# Patient Record
Sex: MALE | Race: WHITE | NOT HISPANIC OR LATINO
[De-identification: names, ages, dates, MRNs, and addresses within clinical notes are randomized per-mention and may not be internally consistent; named-entity substitution may affect disease eponyms.]

---

## 2021-09-20 PROBLEM — Z00.00 ENCOUNTER FOR PREVENTIVE HEALTH EXAMINATION: Status: ACTIVE | Noted: 2021-09-20

## 2021-09-23 ENCOUNTER — APPOINTMENT (OUTPATIENT)
Dept: NEPHROLOGY | Facility: CLINIC | Age: 66
End: 2021-09-23

## 2021-10-03 ENCOUNTER — RESULT REVIEW (OUTPATIENT)
Age: 66
End: 2021-10-03

## 2021-10-04 ENCOUNTER — NON-APPOINTMENT (OUTPATIENT)
Age: 66
End: 2021-10-04

## 2021-10-04 ENCOUNTER — RESULT REVIEW (OUTPATIENT)
Age: 66
End: 2021-10-04

## 2021-10-04 ENCOUNTER — APPOINTMENT (OUTPATIENT)
Dept: HEMATOLOGY ONCOLOGY | Facility: CLINIC | Age: 66
End: 2021-10-04
Payer: COMMERCIAL

## 2021-10-04 VITALS
HEIGHT: 69.49 IN | RESPIRATION RATE: 16 BRPM | SYSTOLIC BLOOD PRESSURE: 138 MMHG | HEART RATE: 84 BPM | TEMPERATURE: 98.6 F | OXYGEN SATURATION: 96 % | WEIGHT: 190 LBS | DIASTOLIC BLOOD PRESSURE: 77 MMHG | BODY MASS INDEX: 27.51 KG/M2

## 2021-10-04 PROCEDURE — 38222 DX BONE MARROW BX & ASPIR: CPT

## 2021-10-04 PROCEDURE — 99215 OFFICE O/P EST HI 40 MIN: CPT | Mod: 25

## 2021-10-04 RX ORDER — LEVOTHYROXINE SODIUM 137 UG/1
TABLET ORAL
Refills: 0 | Status: ACTIVE | COMMUNITY

## 2021-10-04 RX ORDER — TICAGRELOR 90 MG/1
90 TABLET ORAL
Refills: 0 | Status: ACTIVE | COMMUNITY

## 2021-10-04 RX ORDER — ROSUVASTATIN CALCIUM 20 MG/1
20 TABLET, FILM COATED ORAL
Refills: 0 | Status: ACTIVE | COMMUNITY

## 2021-10-04 RX ORDER — METOPROLOL SUCCINATE 25 MG/1
25 TABLET, EXTENDED RELEASE ORAL
Refills: 0 | Status: ACTIVE | COMMUNITY

## 2021-10-04 RX ORDER — VIT A AND D3 IN COD LIVER OIL 1250-135
100 CAPSULE ORAL
Refills: 0 | Status: ACTIVE | COMMUNITY

## 2021-10-04 RX ORDER — TAMSULOSIN HYDROCHLORIDE 0.4 MG/1
0.4 CAPSULE ORAL
Refills: 0 | Status: ACTIVE | COMMUNITY

## 2021-10-04 RX ORDER — PREDNISONE 20 MG/1
20 TABLET ORAL
Refills: 0 | Status: ACTIVE | COMMUNITY

## 2021-10-04 RX ORDER — NITROGLYCERIN 0.6 MG/1
TABLET SUBLINGUAL
Refills: 0 | Status: ACTIVE | COMMUNITY

## 2021-10-04 NOTE — PROCEDURE
[Bone Marrow Biopsy] : bone marrow biopsy [Bone Marrow Aspiration] : bone marrow aspiration  [Patient] : the patient [Patient identification verified] : patient identification verified [Procedure verified and consent obtained] : procedure verified and consent obtained [Laterality verified and correct site marked] : laterality verified and correct site marked [Left] : site: left [Correct positioning] : correct positioning [Prone] : prone [Superior iliac spine was identified] : the superior iliac spine was identified. [The left posterior iliac crest was prepped with betadine and draped, using sterile technique.] : The left posterior iliac crest was prepped with betadine and draped, using sterile technique. [Lidocaine was injected and into the periosteum overlying the site.] : Lidocaine was injected and into the periosteum overlying the site. [Aspirate] : aspirate [Cytogenetics] : cytogenetics [FISH] : FISH [Other ___] : [unfilled] [Biopsy] : biopsy [Flow Cytometry] : flow cytometry [] : The patient was instructed to remove the bandage the following AM. The patient may bathe. Acetaminophen may be taken for discomfort, as per package directions.If there are any other problems, the patient was instructed to call the office. The patient verbalized understanding, and is aware of the office contact numbers. [FreeTextEntry1] : Rule out leukemia

## 2021-10-04 NOTE — PHYSICAL EXAM
[Restricted in physically strenuous activity but ambulatory and able to carry out work of a light or sedentary nature] : Status 1- Restricted in physically strenuous activity but ambulatory and able to carry out work of a light or sedentary nature, e.g., light house work, office work [Normal] : grossly intact [de-identified] : Anxious

## 2021-10-04 NOTE — HISTORY OF PRESENT ILLNESS
[de-identified] : Mr Perez is a very pleasant 66 year old gentleman with PMHx of CAD with h/o AMI and stent placement/ cardiac arrest s/p resuscitation ~3 years ago,  vitiligo, hypothyroidism, BPH,  h/o nephrotic syndrome diagnosed 5 years ago - underwent treatment with steroids and cytoxan, recenlty with relapsed disease started on prednisone 120 mg QOD with DR Card, seen in The ED yesterday for abnormal labs (thrombocytopenia, leucocytosis/monocytosis) seen today for further work up\par \par He feels good overall\par Denies any recent infections, new medications\par COVID vaccine was 1/2021

## 2021-10-04 NOTE — ASSESSMENT
[FreeTextEntry1] : Thrombocytopenia\par Monocytosis\par Has easy bruising\par \par Discussed at length about the differential diagnosis of thrombocytopenia such as viral infection (HIV, Hepatitis B, Hepatitis C), nutritional (B12, folate deficiency), thyroid disorders, liver disease, alcohol, hypersplenism, drug induced, connective tissue disorders, ITP, Aplastic anemia, MDS\par Discussed about dd for monocytosis including infections, medications, CMML etc\par Send blood work including peripheral blood flow cytometry. BCR-ABL\par Stat bone marrow done today after taking informed signed consent\par \par Patient had multiple questions which were answered to satisfaction\par \par Follow up in 1 week\par Labs post visit\par

## 2021-10-12 DIAGNOSIS — N04.9 NEPHROTIC SYNDROME WITH UNSPECIFIED MORPHOLOGIC CHANGES: ICD-10-CM

## 2021-10-14 ENCOUNTER — RESULT REVIEW (OUTPATIENT)
Age: 66
End: 2021-10-14

## 2021-10-14 ENCOUNTER — APPOINTMENT (OUTPATIENT)
Dept: HEMATOLOGY ONCOLOGY | Facility: CLINIC | Age: 66
End: 2021-10-14
Payer: COMMERCIAL

## 2021-10-14 VITALS
BODY MASS INDEX: 24.98 KG/M2 | HEIGHT: 69.49 IN | SYSTOLIC BLOOD PRESSURE: 120 MMHG | RESPIRATION RATE: 18 BRPM | DIASTOLIC BLOOD PRESSURE: 72 MMHG | WEIGHT: 172.56 LBS | TEMPERATURE: 97.1 F | OXYGEN SATURATION: 99 % | HEART RATE: 81 BPM

## 2021-10-14 DIAGNOSIS — C93.10 CHRONIC MYELOMONOCYTIC LEUKEMIA NOT HAVING ACHIEVED REMISSION: ICD-10-CM

## 2021-10-14 PROCEDURE — 99215 OFFICE O/P EST HI 40 MIN: CPT

## 2021-10-14 NOTE — PHYSICAL EXAM
[Restricted in physically strenuous activity but ambulatory and able to carry out work of a light or sedentary nature] : Status 1- Restricted in physically strenuous activity but ambulatory and able to carry out work of a light or sedentary nature, e.g., light house work, office work [Normal] : grossly intact [de-identified] : Anxious

## 2021-10-14 NOTE — HISTORY OF PRESENT ILLNESS
[de-identified] : Mr Perez is a very pleasant 66 year old gentleman with PMHx of CAD with h/o AMI and stent placement/ cardiac arrest s/p resuscitation ~3 years ago,  vitiligo, hypothyroidism, BPH,  h/o nephrotic syndrome diagnosed 5 years ago - underwent treatment with steroids and cytoxan, recenlty with relapsed disease started on prednisone 120 mg QOD with DR Card, seen in The ED yesterday for abnormal labs (thrombocytopenia, leucocytosis/monocytosis) seen today for further work up\par \par He feels good overall\par Denies any recent infections, new medications\par COVID vaccine was 1/2021 [de-identified] : Patient is seen today for follow up\par Accompanied by his wife \par \par Continues to be on prednisone 120 mg since 9/23/21\par Emotionally he has been labile\par \par Bone marrow biopsy and bone marrow aspirate (10/4/21)\par - Chronic  myelomonocytic   leukemia-2 (CMML-2)\par Cytogenetics, NGS- pending\par \par

## 2021-10-14 NOTE — ASSESSMENT
[FreeTextEntry1] : Chronic myelomonocytic leukemia (CMML-2)\par Cytogenetics, NGS panels pending\par Prognostic models can be confounded by the fact that high dose steroids can affect leucocytosis and KENAN can affects Hgb levels\par Also NGS and cytogenetics are pending\par He has at least Intermediate - high risk disease based on Cabrera model \par \par Discussed at length about the diagnosis, work up, staging, prognosis and treatment options\par Explained that this is a rare entity and there is no high level evidence about the management \par Recommend HMA bridge to allogeneic stem cell transplant (if he can be a candidate)\par I have reviewed the risks, benefits and side effects of chemotherapy with the patient. All questions were answered to satisfaction. Patient agrees to pursue the planned chemotherapy.\par Will refer to Dr Brandon for second opinion\par Plan:\par Azacitidine 75 mg/m2 SUBQ x 7 days Q28 days\par Allopurinol with treatment\par \par Nephrotic syndrome\par Follows with Dr Card\par On prednisone 120 mg QD\par He will send urine protein/creatinine today. \par He prefers to do blood work next week when he starts treatment\par \par Emotional counselling provided\par His diagnosis was discussed with Dr Banks (PCP) upon patients request\par \par Follow up in 1 week to begin treatment\par CBC, CMP, LDH, uric acid, phos, magnesium\par

## 2021-10-18 DIAGNOSIS — D69.6 THROMBOCYTOPENIA, UNSPECIFIED: ICD-10-CM

## 2021-10-18 DIAGNOSIS — D72.821 MONOCYTOSIS (SYMPTOMATIC): ICD-10-CM

## 2021-10-19 ENCOUNTER — RESULT REVIEW (OUTPATIENT)
Age: 66
End: 2021-10-19

## 2021-10-19 ENCOUNTER — APPOINTMENT (OUTPATIENT)
Dept: HEMATOLOGY ONCOLOGY | Facility: CLINIC | Age: 66
End: 2021-10-19
Payer: COMMERCIAL

## 2021-10-19 VITALS
SYSTOLIC BLOOD PRESSURE: 109 MMHG | OXYGEN SATURATION: 100 % | DIASTOLIC BLOOD PRESSURE: 70 MMHG | RESPIRATION RATE: 16 BRPM | TEMPERATURE: 97.1 F | WEIGHT: 171.8 LBS | BODY MASS INDEX: 24.87 KG/M2 | HEART RATE: 89 BPM | HEIGHT: 69.49 IN

## 2021-10-19 DIAGNOSIS — C92.00 ACUTE MYELOBLASTIC LEUKEMIA, NOT HAVING ACHIEVED REMISSION: ICD-10-CM

## 2021-10-19 PROCEDURE — 99215 OFFICE O/P EST HI 40 MIN: CPT | Mod: 25

## 2021-10-19 PROCEDURE — 36415 COLL VENOUS BLD VENIPUNCTURE: CPT

## 2021-10-20 PROBLEM — C92.00 AML (ACUTE MYELOBLASTIC LEUKEMIA): Status: ACTIVE | Noted: 2021-10-20

## 2021-10-20 NOTE — ASSESSMENT
[FreeTextEntry1] : Chronic myelomonocytic leukemia (CMML-2)\par Cytogenetics, NGS panels pending\par Prognostic models can be confounded by the fact that high dose steroids can affect leucocytosis and KENAN can affects Hgb levels\par Also NGS and cytogenetics are pending\par He has at least Intermediate - high risk disease based on Cabrera model \par \par Discussed at length about the diagnosis, work up, staging, prognosis and treatment options\par Explained that this is a rare entity and there is no high level evidence about the management \par Recommend HMA bridge to allogeneic stem cell transplant (if he can be a candidate)\par I have reviewed the risks, benefits and side effects of chemotherapy with the patient. All questions were answered to satisfaction. Patient agrees to pursue the planned chemotherapy.\par Will refer to Dr Brandon for second opinion\par Plan:\par Azacitidine 75 mg/m2 SUBQ x 7 days Q28 days\par Allopurinol with treatment\par \par Nephrotic syndrome\par Follows with Dr Card\par On prednisone 120 mg QD\par He will send urine protein/creatinine today. \par He prefers to do blood work next week when he starts treatment\par \par Emotional counselling provided\par His diagnosis was discussed with Dr Banks (PCP) upon patients request\par \par Follow up in 1 week for seth labs\par CBC, CMP, LDH, uric acid, phos, magnesium\par \par Addendum (10/20/21 at 3.30 PM)-\par cytogenetics and NGS shows  inv(16) in 13/20 cells. NGS showing NRAS and PTPN11. 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

## 2021-10-20 NOTE — HISTORY OF PRESENT ILLNESS
[de-identified] : Mr Perez is a very pleasant 66 year old gentleman with PMHx of CAD with h/o AMI and stent placement/ cardiac arrest s/p resuscitation ~3 years ago,  vitiligo, hypothyroidism, BPH,  h/o nephrotic syndrome diagnosed 5 years ago - underwent treatment with steroids and cytoxan, recenlty with relapsed disease started on prednisone 120 mg QOD with DR Card, seen in The ED yesterday for abnormal labs (thrombocytopenia, leucocytosis/monocytosis) seen today for further work up\par \par He feels good overall\par Denies any recent infections, new medications\par COVID vaccine was 1/2021\par \par Bone marrow biopsy and bone marrow aspirate (10/4/21)\par - Chronic  myelomonocytic   leukemia-2 (CMML-2)\par Cytogenetics, NGS- pending\par \par  [de-identified] : Patient is seen today for follow up and C1D2 vidaza (10/18/21-present)\par Accompanied by his wife \par \par Continues to be on prednisone 120 mg since 9/23/21\par He is slowly getting in terms with his diagnosis and treatment options\par He has vague symptoms such as bone pains, thumb stiffness, cold sweats which have all started since he started prednisone\par \par \par

## 2021-10-20 NOTE — CONSULT LETTER
[Dear  ___] : Dear  [unfilled], [Courtesy Letter:] : I had the pleasure of seeing your patient, [unfilled], in my office today. [Please see my note below.] : Please see my note below. [Consult Closing:] : Thank you very much for allowing me to participate in the care of this patient.  If you have any questions, please do not hesitate to contact me. [Sincerely,] : Sincerely, [DrDeirdre  ___] : Dr. VAIL [DrDeirdre ___] : Dr. VAIL [FreeTextEntry3] : Narciso Perez MD, MPH\par Attending Physician\par Hematology Oncology\par [facility] [place]\par [facility]\par

## 2021-10-20 NOTE — PHYSICAL EXAM
[Restricted in physically strenuous activity but ambulatory and able to carry out work of a light or sedentary nature] : Status 1- Restricted in physically strenuous activity but ambulatory and able to carry out work of a light or sedentary nature, e.g., light house work, office work [Normal] : grossly intact [de-identified] : Anxious

## 2021-10-22 ENCOUNTER — APPOINTMENT (OUTPATIENT)
Dept: HEMATOLOGY ONCOLOGY | Facility: CLINIC | Age: 66
End: 2021-10-22

## 2021-11-16 ENCOUNTER — APPOINTMENT (OUTPATIENT)
Dept: HEMATOLOGY ONCOLOGY | Facility: CLINIC | Age: 66
End: 2021-11-16

## 2021-12-14 ENCOUNTER — APPOINTMENT (OUTPATIENT)
Dept: HEMATOLOGY ONCOLOGY | Facility: CLINIC | Age: 66
End: 2021-12-14